# Patient Record
Sex: MALE | Race: WHITE | NOT HISPANIC OR LATINO | Employment: FULL TIME | URBAN - METROPOLITAN AREA
[De-identification: names, ages, dates, MRNs, and addresses within clinical notes are randomized per-mention and may not be internally consistent; named-entity substitution may affect disease eponyms.]

---

## 2018-12-19 ENCOUNTER — EVALUATION (OUTPATIENT)
Dept: PHYSICAL THERAPY | Facility: CLINIC | Age: 48
End: 2018-12-19
Payer: COMMERCIAL

## 2018-12-19 DIAGNOSIS — M54.16 LUMBAR RADICULOPATHY: Primary | ICD-10-CM

## 2018-12-19 PROCEDURE — 97110 THERAPEUTIC EXERCISES: CPT | Performed by: PHYSICAL THERAPIST

## 2018-12-19 PROCEDURE — G8991 OTHER PT/OT GOAL STATUS: HCPCS | Performed by: PHYSICAL THERAPIST

## 2018-12-19 PROCEDURE — 97162 PT EVAL MOD COMPLEX 30 MIN: CPT | Performed by: PHYSICAL THERAPIST

## 2018-12-19 PROCEDURE — G8990 OTHER PT/OT CURRENT STATUS: HCPCS | Performed by: PHYSICAL THERAPIST

## 2018-12-19 RX ORDER — PREDNISONE 10 MG/1
TABLET ORAL DAILY
COMMUNITY
End: 2019-01-17

## 2018-12-19 RX ORDER — CYCLOBENZAPRINE HCL 5 MG
10 TABLET ORAL 3 TIMES DAILY PRN
COMMUNITY
End: 2019-01-17

## 2018-12-19 RX ORDER — GABAPENTIN 100 MG/1
100 CAPSULE ORAL 3 TIMES DAILY
COMMUNITY
End: 2019-01-17

## 2018-12-19 NOTE — LETTER
2018    Bard Christine  [address] [address]    Patient: Kalee Jeffrey   YOB: 1970   Date of Visit: 2018     Encounter Diagnosis     ICD-10-CM    1  Lumbar radiculopathy M54 16        Dear Dr Lilli Smith:    Please review the attached Plan of Care from [facility] recent visit  Please verify that you agree therapy should continue by signing the attached document and sending it back to our office  If you have any questions or concerns, please don't hesitate to call  Sincerely,    Jessica Aguillon, PT      Referring Provider:      I certify that I have read the below Plan of Care and certify the need for these services furnished under this plan of treatment while under my care   DO Emmett  [address] [place] [address]  VIA Facsimile: [phone]          PT Evaluation     Today's date: 2018  Patient name: Kalee Jeffrey  : 1970  MRN: [id number]  Referring provider: Chago Rivers DO  Dx:   Encounter Diagnosis     ICD-10-CM    1  Lumbar radiculopathy M54 16                   Assessment  Assessment details: Pt is a 51year old male presenting to outpatient Physical Therapy with primary complaints of left sided low back pain with left leg pain  Pt presents with decreased L/S AROM with pain into extesnion and L Lateral flexion, decreased L hip pain into abduction, a positive slump test, and TTP over L3  These physical deficits are preventing the patient from participating in usual activities such as walking for prolonged periods of time, lifting at work, and sleeping through the night  Pt would benefit from skilled PT services in order to address these deficits and reach maximum level of function   Thank you kindly for the referral!  Impairments: abnormal or restricted ROM, activity intolerance, impaired physical strength, lacks appropriate home exercise program, pain with function and weight-bearing intolerance  Barriers to therapy: None  Understanding of Dx/Px/POC: good   Prognosis: good    Goals  ST  The patient will be fully compliant with HEP within two weeks    2  Pt will report a decrease in pain by at least two points on VAS within two weeks  3  Pt will demonstrate full L/S AROM into extension  LT  The patient will increase FOTO score to 62 within eight weeks  2  The patient will report full return to performing all lifting duties as work indicating return to functional baseline within eight weeks  3 Pt will demonstrate 50-75% less neural tension of L sciatic nerve within eight weeks  Plan  Patient would benefit from: skilled physical therapy  Planned modality interventions: thermotherapy: hydrocollator packs, cryotherapy and traction  Planned therapy interventions: therapeutic activities, therapeutic exercise, home exercise program, manual therapy, joint mobilization, balance, patient education, neuromuscular re-education, massage, stretching, strengthening and postural training  Frequency: 2x week  Duration in weeks: 8  Plan of Care beginning date: 2018  Plan of Care expiration date: 2019  Treatment plan discussed with: patient        Subjective Evaluation    History of Present Illness  Mechanism of injury: Pt reports that he has had low back pain for a long time, but the hip pain started ion   He notes that at the time of the increase of pain he was very busy at work and believes that he may have over strained himself  Pt reports that his pain is now mostly in his L hip  He notes that there are sharp pains sometimes, and at other times there is no pain at all  He has been using a one step at a time method to go up the steps, leading with his R LE  He also notes that he has a tingling in his L foot, ever since after the night he saw his doctor    Recurrent probem    Pain  Current pain ratin  At best pain ratin  At worst pain ratin  Location: L sided low back  Quality: sharp  Relieving factors: medications (sitting )  Exacerbated by: caugh, sudden movement, stepping on uneven ground, turning movements  Progression: improved    Social Support    Employment status: working ([employer])    Diagnostic Tests  Abnormal x-ray: DJD, arthritis  Treatments  Previous treatment: medication  Patient Goals  Patient goals for therapy: decreased pain, increased motion, increased strength and return to work  Patient goal: to understand his condition more, to be able to independently treat his symptoms wiht pain, to stay active as he gets older         Objective     Neurological Testing     Sensation     Lumbar   Left   Intact: light touch  Diminished: light touch    Right   Intact: light touch    Comments   Left light touch: Diminished at L2 dermatome    Reflexes   Left   Patellar (L4): normal (2+)  Achilles (S1): normal (2+)    Right   Patellar (L4): absent (0)  Achilles (S1): normal (2+)    Active Range of Motion     Lumbar   Flexion: WFL  Extension: 5 degrees with pain  Left lateral flexion: WFL  Right lateral flexion: WFL  Left rotation: WFL and with pain    Strength/Myotome Testing     Left Hip   Planes of Motion   Flexion: 5  Extension: 4  Abduction: 3  External rotation: 4+  Internal rotation: 3+    Right Hip   Planes of Motion   Flexion: 5  Extension: 4  Abduction: 5  External rotation: 5  Internal rotation: 4+    Left Knee   Flexion: 3+  Extension: 4+    Right Knee   Flexion: 4+  Extension: 5    Left Ankle/Foot   Dorsiflexion: 5  Plantar flexion: 5  Inversion: 4  Eversion: 4-    Right Ankle/Foot   Dorsiflexion: 5  Plantar flexion: 5  Inversion: 5  Eversion: 5    Tests       Thoracic   Positive slump  Lumbar   Positive slumped             Precautions: History of Heart Attack, Cardiac Stent, Chronic low back pain     Daily Treatment Diary     Manual              L sided PA at L3 Grade II                                                                     Exercise Diary              Treadmill L hip glides             Standing L/S extension             Lat Stretch                          Seated T/S extension             Seated Sciatic Nerve glides L LE                          S/L T/S rotation             Prone press-Ups             SLR             Overhead weighted ball lift             Dying Bug             Bridge with ball squeeze             Bridge with marches                                                                                  Modalities              MHP             Lumbar Mechanical traction                             HEP: prone press-ups, prone on elbow, L sided hip glides, standing L/S extension

## 2018-12-19 NOTE — PROGRESS NOTES
PT Evaluation     Today's date: 2018  Patient name: Melyssa Perez  : 1970  MRN: [id number]  Referring provider: Kim Leblanc DO  Dx:   Encounter Diagnosis     ICD-10-CM    1  Lumbar radiculopathy M54 16                   Assessment  Assessment details: Pt is a 51year old male presenting to outpatient Physical Therapy with primary complaints of left sided low back pain with left leg pain  Pt presents with decreased L/S AROM with pain into extesnion and L Lateral flexion, decreased L hip pain into abduction, a positive slump test, and TTP over L3  These physical deficits are preventing the patient from participating in usual activities such as walking for prolonged periods of time, lifting at work, and sleeping through the night  Pt would benefit from skilled PT services in order to address these deficits and reach maximum level of function  Thank you kindly for the referral!  Impairments: abnormal or restricted ROM, activity intolerance, impaired physical strength, lacks appropriate home exercise program, pain with function and weight-bearing intolerance  Barriers to therapy: None  Understanding of Dx/Px/POC: good   Prognosis: good    Goals  ST  The patient will be fully compliant with HEP within two weeks    2  Pt will report a decrease in pain by at least two points on VAS within two weeks  3  Pt will demonstrate full L/S AROM into extension  LT  The patient will increase FOTO score to 62 within eight weeks  2  The patient will report full return to performing all lifting duties as work indicating return to functional baseline within eight weeks  3 Pt will demonstrate 50-75% less neural tension of L sciatic nerve within eight weeks      Plan  Patient would benefit from: skilled physical therapy  Planned modality interventions: thermotherapy: hydrocollator packs, cryotherapy and traction  Planned therapy interventions: therapeutic activities, therapeutic exercise, home exercise program, manual therapy, joint mobilization, balance, patient education, neuromuscular re-education, massage, stretching, strengthening and postural training  Frequency: 2x week  Duration in weeks: 8  Plan of Care beginning date: 2018  Plan of Care expiration date: 2019  Treatment plan discussed with: patient        Subjective Evaluation    History of Present Illness  Mechanism of injury: Pt reports that he has had low back pain for a long time, but the hip pain started ion   He notes that at the time of the increase of pain he was very busy at work and believes that he may have over strained himself  Pt reports that his pain is now mostly in his L hip  He notes that there are sharp pains sometimes, and at other times there is no pain at all  He has been using a one step at a time method to go up the steps, leading with his R LE  He also notes that he has a tingling in his L foot, ever since after the night he saw his doctor  Recurrent probem    Pain  Current pain ratin  At best pain ratin  At worst pain ratin  Location: L sided low back  Quality: sharp  Relieving factors: medications (sitting )  Exacerbated by: caugh, sudden movement, stepping on uneven ground, turning movements  Progression: improved    Social Support    Employment status: working ([employer])    Diagnostic Tests  Abnormal x-ray: DJD, arthritis    Treatments  Previous treatment: medication  Patient Goals  Patient goals for therapy: decreased pain, increased motion, increased strength and return to work  Patient goal: to understand his condition more, to be able to independently treat his symptoms wiht pain, to stay active as he gets older         Objective     Neurological Testing     Sensation     Lumbar   Left   Intact: light touch  Diminished: light touch    Right   Intact: light touch    Comments   Left light touch: Diminished at L2 dermatome    Reflexes   Left   Patellar (L4): normal (2+)  Achilles (S1): normal (2+)    Right   Patellar (L4): absent (0)  Achilles (S1): normal (2+)    Active Range of Motion     Lumbar   Flexion: WFL  Extension: 5 degrees with pain  Left lateral flexion: WFL  Right lateral flexion: WFL  Left rotation: WFL and with pain    Strength/Myotome Testing     Left Hip   Planes of Motion   Flexion: 5  Extension: 4  Abduction: 3  External rotation: 4+  Internal rotation: 3+    Right Hip   Planes of Motion   Flexion: 5  Extension: 4  Abduction: 5  External rotation: 5  Internal rotation: 4+    Left Knee   Flexion: 3+  Extension: 4+    Right Knee   Flexion: 4+  Extension: 5    Left Ankle/Foot   Dorsiflexion: 5  Plantar flexion: 5  Inversion: 4  Eversion: 4-    Right Ankle/Foot   Dorsiflexion: 5  Plantar flexion: 5  Inversion: 5  Eversion: 5    Tests       Thoracic   Positive slump  Lumbar   Positive slumped             Precautions: History of Heart Attack, Cardiac Stent, Chronic low back pain     Daily Treatment Diary     Manual              L sided PA at L3 Grade II                                                                     Exercise Diary              Treadmill             L hip glides             Standing L/S extension             Lat Stretch                          Seated T/S extension             Seated Sciatic Nerve glides L LE                          S/L T/S rotation             Prone press-Ups             SLR             Overhead weighted ball lift             Dying Bug             Bridge with ball squeeze             Bridge with marches                                                                                  Modalities              MHP             Lumbar Mechanical traction                             HEP: prone press-ups, prone on elbow, L sided hip glides, standing L/S extension

## 2018-12-21 ENCOUNTER — TRANSCRIBE ORDERS (OUTPATIENT)
Dept: PHYSICAL THERAPY | Facility: CLINIC | Age: 48
End: 2018-12-21

## 2018-12-21 DIAGNOSIS — M54.16 LUMBAR RADICULOPATHY: Primary | ICD-10-CM

## 2018-12-26 ENCOUNTER — OFFICE VISIT (OUTPATIENT)
Dept: PHYSICAL THERAPY | Facility: CLINIC | Age: 48
End: 2018-12-26
Payer: COMMERCIAL

## 2018-12-26 DIAGNOSIS — M54.16 LUMBAR RADICULOPATHY: Primary | ICD-10-CM

## 2018-12-26 PROCEDURE — 97010 HOT OR COLD PACKS THERAPY: CPT

## 2018-12-26 PROCEDURE — 97140 MANUAL THERAPY 1/> REGIONS: CPT

## 2018-12-26 PROCEDURE — 97110 THERAPEUTIC EXERCISES: CPT

## 2018-12-26 PROCEDURE — 97012 MECHANICAL TRACTION THERAPY: CPT

## 2018-12-26 NOTE — PROGRESS NOTES
Daily Note     Today's date: 2018  Patient name: Shane Alonzo  : 1970  MRN: [id number]  Referring provider: Hitesh Dash DO  Dx:   Encounter Diagnosis     ICD-10-CM    1  Lumbar radiculopathy M54 16                   Subjective: Pt states having LBP during the first initial reps of HEP; however, feels relief roughly 10mins later  Objective: See treatment diary below      Assessment: Trial Lumbar mechanical traction, mobs, MPH no pain relief  Continue modalities and manuals upon nv          Plan: Continue plan of care      Precautions: History of Heart Attack, Cardiac Stent, Chronic low back pain     Daily Treatment Diary     Manual              L sided PA at L3 Grade II 8                                                                    Exercise Diary              Treadmill 5'            L hip glides 15x            Standing L/S extension 15x              Lat Stretch 10"x3                         Seated T/S extension             Seated Sciatic Nerve glides L LE 15x  ea                         S/L T/S rotation             Prone press-Ups             SLR             Overhead weighted ball lift             Dying Bug             Bridge with ball squeeze             Bridge with marches                                                                                  Modalities              MHP 10'            Lumbar Mechanical traction  60lb  10+  2set up                             HEP: prone press-ups, prone on elbow, L sided hip glides, standing L/S extension

## 2018-12-28 ENCOUNTER — OFFICE VISIT (OUTPATIENT)
Dept: PHYSICAL THERAPY | Facility: CLINIC | Age: 48
End: 2018-12-28
Payer: COMMERCIAL

## 2018-12-28 DIAGNOSIS — M54.16 LUMBAR RADICULOPATHY: Primary | ICD-10-CM

## 2018-12-28 PROCEDURE — 97110 THERAPEUTIC EXERCISES: CPT | Performed by: PHYSICAL THERAPIST

## 2018-12-28 PROCEDURE — 97012 MECHANICAL TRACTION THERAPY: CPT | Performed by: PHYSICAL THERAPIST

## 2018-12-28 NOTE — PROGRESS NOTES
Daily Note     Today's date: 2018  Patient name: Igor Soto  : 1970  MRN: [id number]  Referring provider: Eugenie Bunch DO  Dx:   Encounter Diagnosis     ICD-10-CM    1  Lumbar radiculopathy M54 16                   Subjective: Pt reports that he has definatley noticed an improvement in back pain  He notes that he still feels a pinching when he rolls in his bed at night  His biggest concern at this time is that his L ankle is feeling weak, and taht he has trouble lifting the toes  Objective: See treatment diary below      Assessment: Pt continues to experience radicular symptoms down his L LE to his knee that appeared to be provoked with prone-press ups indicating that he may have a flexion and not an extension preference  He demonstrates little to no DF during swing phase of the L LE due to tibialis anterior weakness that was likely caused by his radiculopathy  He would benefit from continued skilled therapy to centralize his radicular symptoms and restore strength in his L LE  Plan: Continue per plan of care       Precautions: History of Heart Attack, Cardiac Stent, Chronic low back pain     Daily Treatment Diary     Manual             L sided PA at L3 Grade II 8                                                                    Exercise Diary             Treadmill 5'            L hip glides 15x 15x           Standing L/S extension 15x              Lat Stretch 10"x3            Hamstring Stretch with stool  3 x30"           HR/TR  x30 each                        Seated T/S extension             Seated Sciatic Nerve glides L LE 15x  ea                         S/L T/S rotation  2 x10           Prone press-Ups  x10           SLR             Overhead weighted ball lift             Dying Bug             Bridge with ball squeeze             Bridge with marches                                                                                  Modalities             MHP 10' Lumbar Mechanical traction  60lb  10+  2set up 80#  10'                            HEP: prone press-ups, prone on elbow, L sided hip glides, standing L/S extension

## 2018-12-31 ENCOUNTER — OFFICE VISIT (OUTPATIENT)
Dept: PHYSICAL THERAPY | Facility: CLINIC | Age: 48
End: 2018-12-31
Payer: COMMERCIAL

## 2018-12-31 DIAGNOSIS — M54.16 LUMBAR RADICULOPATHY: Primary | ICD-10-CM

## 2018-12-31 PROCEDURE — 97110 THERAPEUTIC EXERCISES: CPT

## 2018-12-31 PROCEDURE — 97012 MECHANICAL TRACTION THERAPY: CPT

## 2018-12-31 NOTE — PROGRESS NOTES
Daily Note     Today's date: 2018  Patient name: Mary Grace Gibson  : 1970  MRN: [id number]  Referring provider: Dipak Tao DO  Dx:   Encounter Diagnosis     ICD-10-CM    1  Lumbar radiculopathy M54 16                   Subjective: Pt reports everything is getting better; however, pt reports experiencing pain getting out of a car and moving around in the bed  Objective: See treatment diary below      Assessment: Noted pt was able do a bridge to adjust position on the mechanical traction without any pain or discomfort  Introduced new LE strengthening TE, noted significant LE weakness however, no pain  Plan: Continue per plan of care       Precautions: History of Heart Attack, Cardiac Stent, Chronic low back pain     Daily Treatment Diary     Manual            L sided PA at L3 Grade II 8                                                                    Exercise Diary            Treadmill 5'            L hip glides 15x 15x hep          Standing L/S extension 15x    hep          Lat Stretch 10"x3            Hamstring Stretch with stool  3 x30" nv          HR/TR  x30 each x30  ea                       Seated T/S extension             Seated Sciatic Nerve glides L LE 15x  ea  hep                       S/L T/S rotation  2 x10           Prone press-Ups  x10 hep          SLR   2x10          Overhead weighted ball lift             Dying Bug             Bridge with ball squeeze             Bridge with marches             Bridge    2x10                                                                  Modalities            MHP 10'  10'          Lumbar Mechanical traction  60lb  10+  2set up 80#  10' 80#  10'+  2set up                           HEP: prone press-ups, prone on elbow, L sided hip glides, standing L/S extension

## 2019-01-02 ENCOUNTER — CONSULT (OUTPATIENT)
Dept: PAIN MEDICINE | Facility: CLINIC | Age: 49
End: 2019-01-02
Payer: COMMERCIAL

## 2019-01-02 VITALS
SYSTOLIC BLOOD PRESSURE: 120 MMHG | DIASTOLIC BLOOD PRESSURE: 90 MMHG | WEIGHT: 188 LBS | HEART RATE: 72 BPM | BODY MASS INDEX: 28.49 KG/M2 | HEIGHT: 68 IN

## 2019-01-02 DIAGNOSIS — M54.16 LUMBAR RADICULOPATHY: Primary | ICD-10-CM

## 2019-01-02 DIAGNOSIS — M21.372 LEFT FOOT DROP: ICD-10-CM

## 2019-01-02 PROCEDURE — 99244 OFF/OP CNSLTJ NEW/EST MOD 40: CPT | Performed by: ANESTHESIOLOGY

## 2019-01-02 RX ORDER — LOSARTAN POTASSIUM 25 MG/1
25 TABLET ORAL DAILY
Refills: 3 | COMMUNITY
Start: 2018-12-07

## 2019-01-02 RX ORDER — ASPIRIN 81 MG/1
81 TABLET ORAL DAILY
COMMUNITY
End: 2019-01-14

## 2019-01-02 RX ORDER — NAPROXEN 500 MG/1
TABLET ORAL
Refills: 0 | COMMUNITY
Start: 2018-12-14 | End: 2019-01-17

## 2019-01-02 RX ORDER — OMEPRAZOLE 20 MG/1
20 CAPSULE, DELAYED RELEASE ORAL DAILY
Refills: 3 | COMMUNITY
Start: 2018-11-21

## 2019-01-02 RX ORDER — PRAVASTATIN SODIUM 40 MG
40 TABLET ORAL
Refills: 3 | COMMUNITY
Start: 2018-12-10

## 2019-01-02 RX ORDER — METOPROLOL SUCCINATE 25 MG/1
25 TABLET, EXTENDED RELEASE ORAL DAILY
Refills: 3 | COMMUNITY
Start: 2018-12-01

## 2019-01-02 NOTE — PROGRESS NOTES
Assessment:  1  Lumbar radiculopathy - Left    2  Left foot drop        Plan: At this point the patient's pain persists despite time, relative rest, activity modification, and nonsteroidal anti-inflammatories  His pain is significantly interfering with his daily living activities  He is currently undergo a course of physical therapy  He has tried oral steroids gabapentin but is symptoms persist     With neurological deficits, I believe is appropriate to order an MRI of the lumbar spine to rule out any significant etiology of his symptoms  Once we obtain MRI results we will proceed from there  My impressions and treatment recommendations were discussed in detail with the patient who verbalized understanding and had no further questions  Discharge instructions were provided  I personally saw and examined the patient and I agree with the above discussed plan of care  Orders Placed This Encounter   Procedures    MRI lumbar spine without contrast     Standing Status:   Future     Standing Expiration Date:   1/2/2023     Scheduling Instructions: There is no preparation for this test  Please leave your jewelry and valuables at home, wedding rings are the exception  Please bring your insurance cards, a form of photo ID and a list of your medications with you  Arrive 15 minutes prior to your appointment time in order to register  Please bring any prior CT or MRI studies of this area that were not performed at a [facility]  To schedule this appointment, please contact Central Scheduling at [phone]  Order Specific Question:   What is the patient's sedation requirement?      Answer:   No Sedation     New Medications Ordered This Visit   Medications    pravastatin (PRAVACHOL) 40 mg tablet     Sig: Take 40 mg by mouth daily     Refill:  3    omeprazole (PriLOSEC) 20 mg delayed release capsule     Sig: Take 20 mg by mouth daily     Refill:  3    naproxen (NAPROSYN) 500 mg tablet     Sig: TAKE 1 TABLET EVERY 12 HOURS FOR 7 DAYS *TAKE WITH FOOD OR MILK*     Refill:  0    metoprolol succinate (TOPROL-XL) 25 mg 24 hr tablet     Sig: Take 25 mg by mouth daily     Refill:  3    losartan (COZAAR) 25 mg tablet     Sig: Take 25 mg by mouth daily     Refill:  3    aspirin (ECOTRIN LOW STRENGTH) 81 mg EC tablet     Sig: Take 81 mg by mouth daily    aspirin (ECOTRIN) 325 mg EC tablet     Sig: Take 325 mg by mouth every 6 (six) hours as needed for mild pain     Referred by Dr Francisco Sim    History of Present Illness:    Maximus Mclain is a 51 y o  male with a 6 week history of severe left-sided low back and lower extremity pain  He is unaware of any clear precipitating event denies any trauma or injury  His pain is moderate rates it anywhere between 3 to 7/10 on the visual analog scale completely interfering with daily living activities  He is currently undergoing physical therapy has tried oral steroids Tylenol and aspirin without relief  Gabapentin provides good relief but he has discontinued his does not like taking medications  His pain is worse in the morning describes burning cramping shooting down the posterior lateral aspect of his left leg he does feel that his left leg occasionally gives way  Sitting decreases symptoms will most activities aggravate it  I have personally reviewed and/or updated the patient's past medical history, past surgical history, family history, social history, current medications, allergies, and vital signs today  Review of Systems:    Review of Systems   Constitutional: Negative for fever and unexpected weight change  HENT: Negative for trouble swallowing  Eyes: Negative for visual disturbance  Respiratory: Negative for shortness of breath and wheezing  Cardiovascular: Negative for chest pain and palpitations  Gastrointestinal: Negative for constipation, diarrhea, nausea and vomiting     Endocrine: Negative for cold intolerance, heat intolerance and polydipsia  Genitourinary: Negative for difficulty urinating and frequency  Musculoskeletal: Positive for gait problem (difficulty walking, decreased ROM) and joint swelling (joint stiffness)  Negative for arthralgias and myalgias  Skin: Negative for rash  Neurological: Negative for dizziness, seizures, syncope, weakness and headaches  Hematological: Does not bruise/bleed easily  Psychiatric/Behavioral: Negative for dysphoric mood  All other systems reviewed and are negative  Patient Active Problem List   Diagnosis    Left foot drop    Lumbar radiculopathy - Left       Past Medical History:   Diagnosis Date    Arthritis     GERD (gastroesophageal reflux disease)     Hypertension     Myocardial infarction [facility])        Past Surgical History:   Procedure Laterality Date    BRAIN SURGERY      CAROTID STENT         No family history on file  Social History     Occupational History    Not on file  Social History Main Topics    Smoking status: Never Smoker    Smokeless tobacco: Never Used    Alcohol use Yes    Drug use: No    Sexual activity: Not Currently       Current Outpatient Prescriptions on File Prior to Visit   Medication Sig    cyclobenzaprine (FLEXERIL) 5 mg tablet Take 10 mg by mouth 3 (three) times a day as needed for muscle spasms    gabapentin (NEURONTIN) 100 mg capsule Take 100 mg by mouth 3 (three) times a day    predniSONE 10 mg tablet Take by mouth daily     No current facility-administered medications on file prior to visit  No Known Allergies    Physical Exam:    /90   Pulse 72   Ht 5' 8" (1 727 m)   Wt 85 3 kg (188 lb)   BMI 28 59 kg/m²     Constitutional: normal, well developed, well nourished, alert, in no distress and non-toxic and no overt pain behavior    Eyes: anicteric  HEENT: grossly intact  Neck: supple, symmetric, trachea midline and no masses   Pulmonary:even and unlabored  Cardiovascular:No edema or pitting edema present  Skin:Normal without rashes or lesions and well hydrated  Psychiatric:Mood and affect appropriate  Neurologic:Cranial Nerves II-XII grossly intact  Musculoskeletal:normal, difficulty going from sitting to standing to sitting position, no obvious skin lesions or erythema lumbar sacral spine no tenderness to palpation lumbar sacral spine spinous process greater trochanter bilateral, there is left greater than right PSIS tenderness, he does have a 3/5 strength left extensor hallucis longus as well as 3/5 strength left anterior tibialis negative straight leg raising no sensory deficits appreciated deep tendon reflexes 2+ and symmetrical bilateral patella 1+ and diminished bilateral Achilles    Imaging  12/07/18  XRay  lumbar spine           M54 42  AP, lateral and bilateral oblique views of the lumbar spine compared to 3/1/2016  There is stable disc space narrowing and endplate osteophytes at the lumbosacral junction  5 lumbar vertebral bodies are present  No fracture, subluxation, bone lesion or spondylolysis  Lumbar lordosis is normal  Sacroiliac joints appear symmetric  There is severe fecal residue throughout the colon  Impression:  Stable degenerative disc disease at L5-S1  I have personally reviewed pertinent films in PACS

## 2019-01-03 ENCOUNTER — OFFICE VISIT (OUTPATIENT)
Dept: PHYSICAL THERAPY | Facility: CLINIC | Age: 49
End: 2019-01-03
Payer: COMMERCIAL

## 2019-01-03 ENCOUNTER — TELEPHONE (OUTPATIENT)
Dept: OBGYN CLINIC | Facility: HOSPITAL | Age: 49
End: 2019-01-03

## 2019-01-03 DIAGNOSIS — M54.16 LUMBAR RADICULOPATHY: Primary | ICD-10-CM

## 2019-01-03 PROCEDURE — 97112 NEUROMUSCULAR REEDUCATION: CPT

## 2019-01-03 PROCEDURE — 97012 MECHANICAL TRACTION THERAPY: CPT

## 2019-01-03 PROCEDURE — 97110 THERAPEUTIC EXERCISES: CPT

## 2019-01-03 NOTE — TELEPHONE ENCOUNTER
Caller: patient  Call back number: [phone]    Patient is asking if his prior auth for his MRI is completed   Please advise

## 2019-01-03 NOTE — PROGRESS NOTES
Daily Note     Today's date: 1/3/2019  Patient name: Ana Paula Hanson  : 1970  MRN: [id number]  Referring provider: Isatu Long DO  Dx:   Encounter Diagnosis     ICD-10-CM    1  Lumbar radiculopathy M54 16                   Subjective: Pt reports no new changes since lv  Objective: See treatment diary below      Assessment: Performed new TE, no pain or discomfort; however, noted significant LLE weakness during standing TE  Plan: Continue per plan of care  Focus on LE strengthening upon nv       Precautions: History of Heart Attack, Cardiac Stent, Chronic low back pain     Daily Treatment Diary     Manual  12/26 12/28 12/31 1/3         L sided PA at L3 Grade II 8                                                                    Exercise Diary  12/26 12/28 12/31 1/3         Treadmill 5'   5'         L hip glides 15x 15x hep hep         Standing L/S extension 15x    hep hep         Lat Stretch 10"x3            Hamstring Stretch with stool  3 x30" nv          HR/TR  x30 each x30  ea x30  ea                      Seated T/S extension             Seated Sciatic Nerve glides L LE 15x  ea  hep hep                      S/L T/S rotation  2 x10           Prone press-Ups  x10 hep hep         SLR   2x10 standing  4-way  2x10         Marches    2x10         Hs Curls    2#  2x10         Overhead weighted ball lift             Dying Bug             Bridge with ball squeeze             Bridge with marches             Bridge    2x10                                                                  Modalities  12/26 12/28 12/31 1/3         MHP 10'  10' 10'         Lumbar Mechanical traction  60lb  10+  2set up 80#  10' 80#  10'+  2set up 80#  10'+  2set up                          HEP: prone press-ups, prone on elbow, L sided hip glides, standing L/S extension

## 2019-01-04 ENCOUNTER — TELEPHONE (OUTPATIENT)
Dept: PAIN MEDICINE | Facility: CLINIC | Age: 49
End: 2019-01-04

## 2019-01-04 ENCOUNTER — HOSPITAL ENCOUNTER (OUTPATIENT)
Dept: MRI IMAGING | Facility: HOSPITAL | Age: 49
Discharge: HOME | End: 2019-01-04
Attending: ANESTHESIOLOGY
Payer: COMMERCIAL

## 2019-01-04 ENCOUNTER — HOSPITAL ENCOUNTER (OUTPATIENT)
Dept: RADIOLOGY | Facility: HOSPITAL | Age: 49
Discharge: HOME | End: 2019-01-04
Attending: ANESTHESIOLOGY
Payer: COMMERCIAL

## 2019-01-04 ENCOUNTER — TRANSCRIBE ORDERS (OUTPATIENT)
Dept: ADMINISTRATIVE | Facility: HOSPITAL | Age: 49
End: 2019-01-04

## 2019-01-04 DIAGNOSIS — M21.372 LEFT FOOT DROP: ICD-10-CM

## 2019-01-04 DIAGNOSIS — M54.16 LUMBAR RADICULOPATHY: ICD-10-CM

## 2019-01-04 DIAGNOSIS — T15.90XA FOREIGN BODY IN EYE, UNSPECIFIED LATERALITY, INITIAL ENCOUNTER: ICD-10-CM

## 2019-01-04 DIAGNOSIS — T15.90XA FOREIGN BODY IN EYE, UNSPECIFIED LATERALITY, INITIAL ENCOUNTER: Primary | ICD-10-CM

## 2019-01-04 PROCEDURE — 72148 MRI LUMBAR SPINE W/O DYE: CPT

## 2019-01-04 NOTE — TELEPHONE ENCOUNTER
Radiology dept called with significant findings  MRI L-spine showed large HNP/extrusion at L4-5 with severe spinal stenosis

## 2019-01-04 NOTE — TELEPHONE ENCOUNTER
----- Message from Unique Ramirez sent at 1/4/2019  8:12 AM EST -----  Regarding: MRI Authorization  AUTHORIZATION FOR CPT CODE 05801 MRI LUMBAR SPINE [address] [facility] [place] # [id number] AND IS VALID FROM 01/02/2019-02/16/2019  [address]

## 2019-01-07 ENCOUNTER — TELEPHONE (OUTPATIENT)
Dept: RADIOLOGY | Facility: CLINIC | Age: 49
End: 2019-01-07

## 2019-01-07 ENCOUNTER — OFFICE VISIT (OUTPATIENT)
Dept: PHYSICAL THERAPY | Facility: CLINIC | Age: 49
End: 2019-01-07
Payer: COMMERCIAL

## 2019-01-07 DIAGNOSIS — M54.16 LUMBAR RADICULOPATHY: Primary | ICD-10-CM

## 2019-01-07 DIAGNOSIS — M51.26 LUMBAR DISC HERNIATION: Primary | ICD-10-CM

## 2019-01-07 PROCEDURE — 97112 NEUROMUSCULAR REEDUCATION: CPT

## 2019-01-07 PROCEDURE — 97110 THERAPEUTIC EXERCISES: CPT

## 2019-01-07 NOTE — TELEPHONE ENCOUNTER
----- Message from Jaylon Garcia DO sent at 1/7/2019  8:07 AM EST -----  MRI lumbar spine:  L4-L5:  Disc desiccation  Verta Analia is a large broad-based central disc extrusion extending slightly above and below the disc margins   There is severe central canal stenosis with displacement of the cauda equina posteriorly best seen on series 6 image 19         I recommend surgical evaluation

## 2019-01-07 NOTE — TELEPHONE ENCOUNTER
S/w pt, advised of above  Pt questioned 1/16 ov  Advised pt per [facility] - not necessary  Offered surgical referral and phone number for [facility] neurosurgery  Pt verbalized understanding and appreciation  Cancelled 1/16 ov  Forwarding to [facility] - referral to neurosurgery please

## 2019-01-07 NOTE — PROGRESS NOTES
Daily Note     Today's date: 2019  Patient name: Concepcion Penny  : 1970  MRN: [id number]  Referring provider: Mar Zimmer DO  Dx:   Encounter Diagnosis     ICD-10-CM    1  Lumbar radiculopathy M54 16                   Subjective: Pt states his pain in his hip has been improving, notices it more in the morning  Reports he feels more weakness than pain  Objective: See treatment diary below      Assessment: Held MHP and mechanical traction, to note any changes and monitor symptoms upon nv  Introduce new LE strengthening TE without complain of pain/discomfort  Noted supine->sit, sit->supine, and sit->stand pt experienced minimal to no pain during transition  Plan: Continue to focus on LE strengthening upon nv  Precautions: History of Heart Attack, Cardiac Stent, Chronic low back pain     Daily Treatment Diary     Manual  12/26 12/28 12/31 1/3         L sided PA at L3 Grade II 8                                                                    Exercise Diary  12/26 12/28 12/31 1/3 1/        Treadmill 5'   5' 5'        L hip glides 15x 15x hep hep hep        Standing L/S extension 15x    hep hep hep        Lat Stretch 10"x3            Hamstring Stretch with stool  3 x30" nv  30"X3        HR/TR  x30 each x30  ea x30  ea x30  ea                     Seated T/S extension             Seated Sciatic Nerve glides L LE 15x  ea  hep hep HEP                     S/L T/S rotation  2 x10           Prone press-Ups  x10 hep hep hep        SLR   2x10 standing  4-way  2x10 standing  4-way  2x10        Marches    2x10 2#  2x10        Hs Curls    2#  2x10 2#  2X10        Lat  Step      4"  2x10        Step-up/dwn     4"  2x10        Wt shifting     10"x10        SLS     nv as ana          LAQ     2#  2x10        Overhead weighted ball lift     7#  2x10        Dying Bug     nv        Bridge with ball squeeze             Supine marches     2x10        Bridge    2x10  2x10 Modalities  12/26 12/28 12/31 1/3 1/7        MHP 10'  10' 10' held        Lumbar Mechanical traction  60lb  10+  2set up 80#  10' 80#  10'+  2set up 80#  10'+  2set up held                         HEP: prone press-ups, prone on elbow, L sided hip glides, standing L/S extension

## 2019-01-09 ENCOUNTER — OFFICE VISIT (OUTPATIENT)
Dept: PHYSICAL THERAPY | Facility: CLINIC | Age: 49
End: 2019-01-09
Payer: COMMERCIAL

## 2019-01-09 DIAGNOSIS — M54.16 LUMBAR RADICULOPATHY: Primary | ICD-10-CM

## 2019-01-09 PROCEDURE — 97110 THERAPEUTIC EXERCISES: CPT

## 2019-01-09 PROCEDURE — 97112 NEUROMUSCULAR REEDUCATION: CPT

## 2019-01-09 NOTE — PROGRESS NOTES
Daily Note     Today's date: 2019  Patient name: Kristy Grossman  : 1970  MRN: [id number]  Referring provider: Kerrie Pond DO  Dx:   Encounter Diagnosis     ICD-10-CM    1  Lumbar radiculopathy M54 16                   Subjective: Pt states his hip is the same as last visit, minimal pain  Pt reports getting an update on his MRI, MD referred him to a back surgeon  Objective: See treatment diary below      Assessment: D/c mechanical traction and MHP, no differences noted  Introduced elliptical and bike, no complain of pain or discomfort  However, noted pt was anxious coming off equipment possibly due to fear of immediate pain  Plan: Continue to focus on LE strengthening upon nv  Precautions: History of Heart Attack, Cardiac Stent, Chronic low back pain     Daily Treatment Diary     Manual  12/26 12/28 12/31 1/3         L sided PA at L3 Grade II 8                                                                    Exercise Diary  12/26 12/28 12/31 1/3 1/7 1/9       Treadmill 5'   5' 5' D/c                                 L hip glides 15x 15x hep hep hep        Standing L/S extension 15x    hep hep hep        Lat Stretch 10"x3            Hamstring Stretch with stool  3 x30" nv  30"X3        HR/TR  x30 each x30  ea x30  ea x30  ea x30  ea                    Seated T/S extension             Seated Sciatic Nerve glides L LE 15x  ea  hep hep HEP hep                    S/L T/S rotation  2 x10           Prone press-Ups  x10 hep hep hep hep       SLR   2x10 standing  4-way  2x10 standing  4-way  2x10 standing  4-way  2x10       Marches    2x10 2#  2x10 2#  2x10       Hs Curls    2#  2x10 2#  2X10 2#  2x10       Lat  Step      4"  2x10 4"  2x10       Step-up/dwn     4"  2x10 4"  2x10       Wt shifting     10"x10 10"X10       SLS     nv as ana          LAQ     2#  2x10 2#  2x10       Overhead weighted ball lift     7#  2x10        Dying Bug     nv        Bridge with ball squeeze             Supine  2x10        Bridge    2x10  2x10                                                                Modalities  12/26 12/28 12/31 1/3 1/7 1/9       MHP 10'  10' 10' held D/C       Lumbar Mechanical traction  60lb  10+  2set up 80#  10' 80#  10'+  2set up 80#  10'+  2set up held D/C                         HEP: prone press-ups, prone on elbow, L sided hip glides, standing L/S extension

## 2019-01-14 ENCOUNTER — OFFICE VISIT (OUTPATIENT)
Dept: PHYSICAL THERAPY | Facility: CLINIC | Age: 49
End: 2019-01-14
Payer: COMMERCIAL

## 2019-01-14 ENCOUNTER — OFFICE VISIT (OUTPATIENT)
Dept: NEUROSURGERY | Facility: CLINIC | Age: 49
End: 2019-01-14
Payer: COMMERCIAL

## 2019-01-14 VITALS
WEIGHT: 190 LBS | HEIGHT: 68 IN | DIASTOLIC BLOOD PRESSURE: 89 MMHG | SYSTOLIC BLOOD PRESSURE: 141 MMHG | HEART RATE: 68 BPM | TEMPERATURE: 97.8 F | BODY MASS INDEX: 28.79 KG/M2 | RESPIRATION RATE: 16 BRPM

## 2019-01-14 DIAGNOSIS — M21.372 FOOT DROP, LEFT: Primary | ICD-10-CM

## 2019-01-14 DIAGNOSIS — M51.26 LUMBAR DISC HERNIATION: ICD-10-CM

## 2019-01-14 DIAGNOSIS — M54.16 LUMBAR RADICULOPATHY: Primary | ICD-10-CM

## 2019-01-14 PROCEDURE — 99243 OFF/OP CNSLTJ NEW/EST LOW 30: CPT | Performed by: NEUROLOGICAL SURGERY

## 2019-01-14 PROCEDURE — 97110 THERAPEUTIC EXERCISES: CPT

## 2019-01-14 RX ORDER — CHLORHEXIDINE GLUCONATE 0.12 MG/ML
15 RINSE ORAL ONCE
Status: CANCELLED | OUTPATIENT
Start: 2019-01-14 | End: 2019-01-14

## 2019-01-14 RX ORDER — CEFAZOLIN SODIUM 2 G/50ML
2000 SOLUTION INTRAVENOUS ONCE
Status: CANCELLED | OUTPATIENT
Start: 2019-01-14 | End: 2019-01-14

## 2019-01-14 NOTE — PROGRESS NOTES
Daily Note     Today's date: 2019  Patient name: Casey Montenegro  : 1970  MRN: [id number]  Referring provider: Carlton Heaton DO  Dx:   Encounter Diagnosis     ICD-10-CM    1  Lumbar radiculopathy M54 16            Pt arrived 21' late       Subjective: Pt reports LB is feeling looser  Pt states going to a  during the weekend, towards the end of the day felt muscle soreness  Objective: See treatment diary below      Assessment: Noted increased LE strengthen during standing TE  No complain of radiating  pain down LLE transitioning from sit->stand  Plan: Progress below LE TE  Precautions: History of Heart Attack, Cardiac Stent, Chronic low back pain     Daily Treatment Diary   *EPOC 19    Manual  12/26 12/28 12/31 1/3         L sided PA at L3 Grade II 8                                                                    Exercise Diary  12/26 12/28 12/31 1/3 1/7 1/9 1/14      Treadmill 5'   5' 5' D/c D/c      Elliptical        5'      Bike       5'      L hip glides 15x 15x hep hep hep  hep      Standing L/S extension 15x    hep hep hep  hep      Lat Stretch 10"x3            Hamstring Stretch with stool  3 x30" nv  30"X3        HR/TR  x30 each x30  ea x30  ea x30  ea x30  ea                    Seated T/S extension             Seated Sciatic Nerve glides L LE 15x  ea  hep hep HEP hep hep                   S/L T/S rotation  2 x10           Prone press-Ups  x10 hep hep hep hep hep      SLR   2x10 standing  4-way  2x10 standing  4-way  2x10 standing  4-way  2x10 standing  4-way  2x10 PTB  nv     Marches    2x10 2#  2x10 2#  2x10 2#  2x10      Hs Curls    2#  2x10 2#  2X10 2#  2x10 2#  2x10      Lat  Step      4"  2x10 4"  2x10       Step-up/dwn     4"  2x10 4"  2x10       Wt shifting     10"x10 10"X10 10"x10      SLS     nv as ana          LAQ     2#  2x10 2#  2x10 2#  5"x20      Overhead weighted ball lift     7#  2x10        Dying Bug     nv        Bridge with ball squeeze             Supine josie     2x10        Bridge    2x10  2x10                                                                Modalities  12/26 12/28 12/31 1/3 1/7 1/9       MHP 10'  10' 10' held D/C       Lumbar Mechanical traction  60lb  10+  2set up 80#  10' 80#  10'+  2set up 80#  10'+  2set up held D/C                         HEP: prone press-ups, prone on elbow, L sided hip glides, standing L/S extension

## 2019-01-14 NOTE — PROGRESS NOTES
Assessment/Plan:    No problem-specific Assessment & Plan notes found for this encounter  Patient with large lumbar herniate disc with severe compression and progressive left foot drop  Currently DF of the foot is severely weak  Concern for permanent foot drop without surgical decompression  OR for lumbar laminectomy and discectomy at L4-5     Diagnoses and all orders for this visit:    Foot drop, left  -     UA w Reflex to Microscopic w Reflex to Culture  -     Comprehensive metabolic panel; Future  -     CBC and differential; Future  -     APTT; Future  -     Protime-INR; Future  -     HEMOGLOBIN A1C W/ EAG ESTIMATION; Future    Lumbar disc herniation  -     Ambulatory referral to Neurosurgery  -     Case request operating room: LAMINECTOMY LUMBAR W DISCECTOMY, L4-5 BILATERAL; Standing  -     Case request operating room: LAMINECTOMY LUMBAR W DISCECTOMY, L4-5 BILATERAL  -     UA w Reflex to Microscopic w Reflex to Culture  -     Comprehensive metabolic panel; Future  -     CBC and differential; Future  -     APTT; Future  -     Protime-INR; Future  -     HEMOGLOBIN A1C W/ EAG ESTIMATION; Future    Other orders  -     Diet NPO; Sips with meds; Standing  -     Void on call to OR; Standing  -     Insert peripheral IV; Standing  -     Nursing Communication Use 2 CHG cloths, have the patient wash his/her body from the neck down or have staff wash entire body (from neck down) if patient is unable; Standing  -     chlorhexidine (PERIDEX) 0 12 % oral rinse 15 mL; Swish and spit 15 mL once   -     ceFAZolin (ANCEF) IVPB (premix) 2,000 mg; Infuse 2,000 mg into a venous catheter once           Subjective:      Patient ID: Rosendo  is a 51 y o  male  Pleasant male who develop acute back pain and left lower extremity pain in early December he tried PT and injections however recently he developed worsening left foot weakness  He notes that he has trouble walking and can not run at this time   He reports that this has been getting progressively worse over the past few days  He had a prior cardiac history and takes ASA  He reports that he has no bowel or bladder issues  The following portions of the patient's history were reviewed and updated as appropriate:   He  has a past medical history of Arthritis; GERD (gastroesophageal reflux disease); Hypertension; and Myocardial infarction ([facility] [place] [address] )  He   Patient Active Problem List    Diagnosis Date Noted    Lumbar disc herniation 01/14/2019    Left foot drop 01/02/2019    Lumbar radiculopathy - Left 01/02/2019     He  has a past surgical history that includes Carotid stent  His family history is not on file  He  reports that he has never smoked  He has never used smokeless tobacco  He reports that he drinks alcohol  He reports that he does not use drugs  Current Outpatient Prescriptions on File Prior to Visit   Medication Sig    aspirin (ECOTRIN) 325 mg EC tablet Take 325 mg by mouth every 6 (six) hours as needed for mild pain    losartan (COZAAR) 25 mg tablet Take 25 mg by mouth daily    metoprolol succinate (TOPROL-XL) 25 mg 24 hr tablet Take 25 mg by mouth daily    omeprazole (PriLOSEC) 20 mg delayed release capsule Take 20 mg by mouth daily    pravastatin (PRAVACHOL) 40 mg tablet Take 40 mg by mouth daily    cyclobenzaprine (FLEXERIL) 5 mg tablet Take 10 mg by mouth 3 (three) times a day as needed for muscle spasms    gabapentin (NEURONTIN) 100 mg capsule Take 100 mg by mouth 3 (three) times a day    naproxen (NAPROSYN) 500 mg tablet TAKE 1 TABLET EVERY 12 HOURS FOR 7 DAYS *TAKE WITH FOOD OR MILK*    predniSONE 10 mg tablet Take by mouth daily    [DISCONTINUED] aspirin (ECOTRIN LOW STRENGTH) 81 mg EC tablet Take 81 mg by mouth daily     No current facility-administered medications on file prior to visit       Review of Systems   Constitutional: Positive for fatigue  HENT: Negative  Eyes: Negative  Respiratory: Negative      Cardiovascular:        3 stents placed    Gastrointestinal: Negative  Endocrine: Negative  Genitourinary: Positive for difficulty urinating (when standing)  Musculoskeletal: Positive for back pain (across waist when standing, sharp pain when coughing or stepping wrong  )  Skin: Negative  Neurological: Positive for weakness (left leg weakness) and numbness (left foot Numbness, at times into leg with N&T)  Hematological:        ASA 81 mg   Psychiatric/Behavioral: Negative  Objective:      /89 (BP Location: Left arm)   Pulse 68   Temp 97 8 °F (36 6 °C) (Tympanic)   Resp 16   Ht 5' 8" (1 727 m)   Wt 86 2 kg (190 lb)   BMI 28 89 kg/m²          Physical Exam   Constitutional: He is oriented to person, place, and time  He appears well-developed  HENT:   Head: Normocephalic and atraumatic  Eyes: Pupils are equal, round, and reactive to light  EOM are normal    Cardiovascular: Normal rate  Pulmonary/Chest: Effort normal    Musculoskeletal: Normal range of motion  Neurological: He is alert and oriented to person, place, and time  He has normal reflexes  No cranial nerve deficit or sensory deficit     Left foot DF 3/5, 5/5 throughout  Right LE 5/5

## 2019-01-16 ENCOUNTER — APPOINTMENT (OUTPATIENT)
Dept: PHYSICAL THERAPY | Facility: CLINIC | Age: 49
End: 2019-01-16
Payer: COMMERCIAL

## 2019-01-17 ENCOUNTER — APPOINTMENT (OUTPATIENT)
Dept: LAB | Facility: HOSPITAL | Age: 49
End: 2019-01-17
Attending: NEUROLOGICAL SURGERY
Payer: COMMERCIAL

## 2019-01-17 DIAGNOSIS — M21.372 FOOT DROP, LEFT: ICD-10-CM

## 2019-01-17 DIAGNOSIS — M51.26 LUMBAR DISC HERNIATION: ICD-10-CM

## 2019-01-17 LAB
ALBUMIN SERPL BCP-MCNC: 3.9 G/DL (ref 3.5–5)
ALP SERPL-CCNC: 58 U/L (ref 46–116)
ALT SERPL W P-5'-P-CCNC: 43 U/L (ref 12–78)
ANION GAP SERPL CALCULATED.3IONS-SCNC: 7 MMOL/L (ref 4–13)
APTT PPP: 39 SECONDS (ref 26–38)
AST SERPL W P-5'-P-CCNC: 31 U/L (ref 5–45)
BASOPHILS # BLD AUTO: 0.03 THOUSANDS/ΜL (ref 0–0.1)
BASOPHILS NFR BLD AUTO: 1 % (ref 0–1)
BILIRUB SERPL-MCNC: 0.6 MG/DL (ref 0.2–1)
BILIRUB UR QL STRIP: NEGATIVE
BUN SERPL-MCNC: 18 MG/DL (ref 5–25)
CALCIUM SERPL-MCNC: 9 MG/DL (ref 8.3–10.1)
CHLORIDE SERPL-SCNC: 103 MMOL/L (ref 100–108)
CLARITY UR: CLEAR
CO2 SERPL-SCNC: 31 MMOL/L (ref 21–32)
COLOR UR: YELLOW
CREAT SERPL-MCNC: 1.09 MG/DL (ref 0.6–1.3)
EOSINOPHIL # BLD AUTO: 0.14 THOUSAND/ΜL (ref 0–0.61)
EOSINOPHIL NFR BLD AUTO: 2 % (ref 0–6)
ERYTHROCYTE [DISTWIDTH] IN BLOOD BY AUTOMATED COUNT: 13.5 % (ref 11.6–15.1)
EST. AVERAGE GLUCOSE BLD GHB EST-MCNC: 105 MG/DL
GFR SERPL CREATININE-BSD FRML MDRD: 79 ML/MIN/1.73SQ M
GLUCOSE P FAST SERPL-MCNC: 97 MG/DL (ref 65–99)
GLUCOSE UR STRIP-MCNC: NEGATIVE MG/DL
HBA1C MFR BLD: 5.3 % (ref 4.2–6.3)
HCT VFR BLD AUTO: 47.3 % (ref 36.5–49.3)
HGB BLD-MCNC: 15.6 G/DL (ref 12–17)
HGB UR QL STRIP.AUTO: NEGATIVE
IMM GRANULOCYTES # BLD AUTO: 0.01 THOUSAND/UL (ref 0–0.2)
IMM GRANULOCYTES NFR BLD AUTO: 0 % (ref 0–2)
INR PPP: 0.98 (ref 0.86–1.17)
KETONES UR STRIP-MCNC: NEGATIVE MG/DL
LEUKOCYTE ESTERASE UR QL STRIP: NEGATIVE
LYMPHOCYTES # BLD AUTO: 1.77 THOUSANDS/ΜL (ref 0.6–4.47)
LYMPHOCYTES NFR BLD AUTO: 29 % (ref 14–44)
MCH RBC QN AUTO: 28.7 PG (ref 26.8–34.3)
MCHC RBC AUTO-ENTMCNC: 33 G/DL (ref 31.4–37.4)
MCV RBC AUTO: 87 FL (ref 82–98)
MONOCYTES # BLD AUTO: 0.62 THOUSAND/ΜL (ref 0.17–1.22)
MONOCYTES NFR BLD AUTO: 10 % (ref 4–12)
NEUTROPHILS # BLD AUTO: 3.55 THOUSANDS/ΜL (ref 1.85–7.62)
NEUTS SEG NFR BLD AUTO: 58 % (ref 43–75)
NITRITE UR QL STRIP: NEGATIVE
PH UR STRIP.AUTO: 7 [PH] (ref 4.5–8)
PLATELET # BLD AUTO: 219 THOUSANDS/UL (ref 149–390)
PMV BLD AUTO: 11 FL (ref 8.9–12.7)
POTASSIUM SERPL-SCNC: 4.2 MMOL/L (ref 3.5–5.3)
PROT SERPL-MCNC: 7.5 G/DL (ref 6.4–8.2)
PROT UR STRIP-MCNC: NEGATIVE MG/DL
PROTHROMBIN TIME: 12.4 SECONDS (ref 11.8–14.2)
RBC # BLD AUTO: 5.44 MILLION/UL (ref 3.88–5.62)
SODIUM SERPL-SCNC: 141 MMOL/L (ref 136–145)
SP GR UR STRIP.AUTO: 1.01 (ref 1–1.03)
UROBILINOGEN UR QL STRIP.AUTO: 0.2 E.U./DL
WBC # BLD AUTO: 6.12 THOUSAND/UL (ref 4.31–10.16)

## 2019-01-17 PROCEDURE — 81003 URINALYSIS AUTO W/O SCOPE: CPT | Performed by: NEUROLOGICAL SURGERY

## 2019-01-17 PROCEDURE — 85730 THROMBOPLASTIN TIME PARTIAL: CPT

## 2019-01-17 PROCEDURE — 85025 COMPLETE CBC W/AUTO DIFF WBC: CPT

## 2019-01-17 PROCEDURE — 80053 COMPREHEN METABOLIC PANEL: CPT

## 2019-01-17 PROCEDURE — 83036 HEMOGLOBIN GLYCOSYLATED A1C: CPT

## 2019-01-17 PROCEDURE — 36415 COLL VENOUS BLD VENIPUNCTURE: CPT

## 2019-01-17 PROCEDURE — 85610 PROTHROMBIN TIME: CPT
